# Patient Record
Sex: FEMALE | Race: WHITE | NOT HISPANIC OR LATINO | Employment: UNEMPLOYED | URBAN - NONMETROPOLITAN AREA
[De-identification: names, ages, dates, MRNs, and addresses within clinical notes are randomized per-mention and may not be internally consistent; named-entity substitution may affect disease eponyms.]

---

## 2018-09-14 ENCOUNTER — OFFICE VISIT (OUTPATIENT)
Dept: CARDIOLOGY | Facility: CLINIC | Age: 53
End: 2018-09-14

## 2018-09-14 VITALS
BODY MASS INDEX: 28.17 KG/M2 | WEIGHT: 165 LBS | HEIGHT: 64 IN | HEART RATE: 78 BPM | SYSTOLIC BLOOD PRESSURE: 126 MMHG | DIASTOLIC BLOOD PRESSURE: 72 MMHG

## 2018-09-14 DIAGNOSIS — R55 SYNCOPE, UNSPECIFIED SYNCOPE TYPE: ICD-10-CM

## 2018-09-14 DIAGNOSIS — I73.9 CLAUDICATION IN PERIPHERAL VASCULAR DISEASE (HCC): ICD-10-CM

## 2018-09-14 DIAGNOSIS — E11.9 TYPE 2 DIABETES MELLITUS WITHOUT COMPLICATION, UNSPECIFIED LONG TERM INSULIN USE STATUS: Primary | ICD-10-CM

## 2018-09-14 DIAGNOSIS — Z72.0 NICOTINE ABUSE: ICD-10-CM

## 2018-09-14 PROCEDURE — 93000 ELECTROCARDIOGRAM COMPLETE: CPT | Performed by: INTERNAL MEDICINE

## 2018-09-14 PROCEDURE — 99204 OFFICE O/P NEW MOD 45 MIN: CPT | Performed by: INTERNAL MEDICINE

## 2018-09-14 RX ORDER — INSULIN GLARGINE 100 [IU]/ML
25 INJECTION, SOLUTION SUBCUTANEOUS DAILY
COMMUNITY

## 2018-09-14 RX ORDER — CHLORAL HYDRATE 500 MG
1 CAPSULE ORAL DAILY
COMMUNITY

## 2018-09-14 RX ORDER — GABAPENTIN 300 MG/1
300 CAPSULE ORAL NIGHTLY
COMMUNITY

## 2018-09-14 RX ORDER — CEPHRADINE 500 MG
1 CAPSULE ORAL
COMMUNITY

## 2018-09-14 RX ORDER — LORATADINE 10 MG/1
10 TABLET ORAL DAILY
COMMUNITY

## 2018-09-14 NOTE — PROGRESS NOTES
[facility] Cardiology  OFFICE NOTE    Radha Najera  52 y.o. female    09/14/2018  1. Type 2 diabetes mellitus without complication, unspecified long term insulin use status (CMS/[facility])    2. Nicotine abuse    3. Syncope, unspecified syncope type    4. Claudication in peripheral vascular disease (CMS/[facility])        Chief complaint -syncope and claudication pain in the leg    History of present [id number] lady who came in with complaining of syncope while she is walking she had 3 episodes last episode about 3 weeks ago and she has been a diabetic for about 2 years, and she was morbidly obese before she used to weigh around 300 pounds and she has lost a lot of weight and now currently weighs 165 pounds.  She used to smoke 2 pack a day and now smokes about 6 cigarettes a day.  She is on medications for diabetes not on any medications which can lower the blood pressure.  She does have claudication pain in the legs left leg greater than right leg.  Has some numbness and tingling in both hands and feet.  Denies any GI symptoms of constipation or diarrhea.        No Known Allergies      Past Medical History:   Diagnosis Date   • Diabetes mellitus (CMS/[facility])    • Stroke (CMS/[facility])          Past Surgical History:   Procedure Laterality Date   • HYSTERECTOMY           Family History   Problem Relation Age of Onset   • Cancer Mother          Social History     Social History   • Marital status:      Spouse name: N/A   • Number of children: N/A   • Years of education: N/A     Occupational History   • Not on file.     Social History Main Topics   • Smoking status: Current Every Day Smoker     Packs/day: 0.50     Types: Cigarettes   • Smokeless tobacco: Never Used   • Alcohol use No   • Drug use: No   • Sexual activity: Defer     Other Topics Concern   • Not on file     Social History Narrative   • No narrative on file         Current Outpatient Prescriptions   Medication Sig Dispense Refill   •  "Alpha-Lipoic Acid 200 MG capsule Take 1 capsule by mouth.     • Ascorbic Acid (VITAMIN C PO) Take 1,000 mg by mouth Daily.     • B Complex-C (SUPER B COMPLEX PO) Take 1 tablet by mouth Daily.     • Chromium-Cinnamon (CINNAMON PLUS CHROMIUM) 200-1000 MCG-MG capsule Take 1 tablet by mouth Daily.     • Empagliflozin (JARDIANCE) 25 MG tablet Take 1 tablet by mouth Daily.     • gabapentin (NEURONTIN) 300 MG capsule Take 300 mg by mouth Every Night.     • insulin glargine (LANTUS) 100 UNIT/ML injection Inject 25 Units under the skin into the appropriate area as directed Daily.     • loratadine (CLARITIN) 10 MG tablet Take 10 mg by mouth Daily.     • magnesium oxide (MAGOX) 400 (241.3 Mg) MG tablet tablet Take 400 mg by mouth Daily.     • metFORMIN (GLUCOPHAGE) 500 MG tablet Take 500 mg by mouth Daily With Breakfast.     • Omega-3 1000 MG capsule Take 1 capsule by mouth Daily.       No current facility-administered medications for this visit.          Review of Systems     Constitution: Denies any fatigue, fever or chills    HENT: Denies any headache, hearing impairment,     Eyes: Denies any blurring of vision, or photophobia     Cardivascular - As per history of present illness     Respiratory system- denies any COPD,  shortness of breath, Sleep apnea     Endocrine:   diabetes,                         Musculoskeletal:  No history of arthritis with musculoskeletal problems    Gastrointestinal: No nausea, vomiting, or melena    Genitourinary: No dysuria or hematuria    Neurological:   No history of seizure disorder, stroke, memory problems, has peripheral neuropathy    Psychiatric/Behavioral:        No history of depression    Hematological- no history of easy bruising or any bleeding diathesis            OBJECTIVE    /72   Pulse 78   Ht 162.6 cm (64\")   Wt 74.8 kg (165 lb)   BMI 28.32 kg/m²       Physical Exam     Constitutional: is oriented to person, place, and time.     Skin-warm and dry    Well developed and " nourished in no acute distress      Head: Normocephalic and atraumatic.     Eyes: Pupils are equal    Neck: Neck supple. No bruit in the carotids    Cardiovascular: [place] in the fifth intercostal space   Regular rate, and  Rhythm,    S1 greater than S2, no S3 or S4, no gallop     Pulmonary/Chest:   Air  Entry is equal on both sides  No wheezing or crackles,      Abdominal: Soft.  No hepatosplenomegaly, bowel sounds are present    Musculoskeletal: No kyphoscoliosis, no significant thickening of the joints    Neurological: is alert and oriented to person, place, and time.    cranial nerve are intact .   No motor or sensory deficit    Extremities-no edema, no radial femoral delay      Psychiatric: He has a normal mood and affect.                  His behavior is normal.             ECG 12 Lead  Date/Time: 9/14/2018 8:33 AM  Performed by: VLADISLAV MCGHEE  Authorized by: VLADISLAV MCGHEE   Comparison: not compared with previous ECG   Rhythm: sinus rhythm  Clinical impression: normal ECG                           A/P  Syncope possibly autonomic related to do a tilt table test to rule out postural hypotension when she stands up and walking.    Occasional palpitations will do exercise treadmill to rule out ischemia also and arrhythmias as she has risk factors for CAD with prior history of obesity, diabetes and tobacco use.    Claudication pain in left leg greater than right leg will do a MAYRLIN.  Will get an echo to assess his LV function and valvular function.    Tobacco use doctor about quitting smoking.    She'll bring all the labs she had done by her family doctor in [place] when she comes back in 6 weeks              This document has been electronically signed by Vladislav Mcghee MD on September 14, 2018 8:33 AM       EMR Dragon/Transcription disclaimer:   Some of this note may be an electronic transcription/translation of spoken language to printed text. The electronic translation of spoken language  may permit erroneous, or at times, nonsensical words or phrases to be inadvertently transcribed; Although I have reviewed the note for such errors, some may still exist.

## 2018-10-05 ENCOUNTER — DOCUMENTATION (OUTPATIENT)
Dept: CARDIOLOGY | Facility: CLINIC | Age: 53
End: 2018-10-05

## 2018-10-05 DIAGNOSIS — R55 SYNCOPE, UNSPECIFIED SYNCOPE TYPE: Primary | ICD-10-CM

## 2018-10-05 LAB
BH CV LOWER ARTERIAL LEFT ABI RATIO: 1.1
BH CV LOWER ARTERIAL LEFT CALF RATIO: 1.16
BH CV LOWER ARTERIAL LEFT DORSALIS PEDIS SYS MAX: 132 MMHG
BH CV LOWER ARTERIAL LEFT LOW THIGH SYS MAX: 151 MMHG
BH CV LOWER ARTERIAL LEFT POPLITEAL SYS MAX: 142 MMHG
BH CV LOWER ARTERIAL LEFT POST TIBIAL SYS MAX: 134 MMHG
BH CV LOWER ARTERIAL RIGHT ABI RATIO: 1.16
BH CV LOWER ARTERIAL RIGHT CALF RATIO: 1.3
BH CV LOWER ARTERIAL RIGHT DORSALIS PEDIS SYS MAX: 137 MMHG
BH CV LOWER ARTERIAL RIGHT LOW THIGH SYS MAX: 156 MMHG
BH CV LOWER ARTERIAL RIGHT POPLITEAL SYS MAX: 159 MMHG
BH CV LOWER ARTERIAL RIGHT POST TIBIAL SYS MAX: 142 MMHG
BH CV STRESS BP STAGE 1: NORMAL
BH CV STRESS BP STAGE 2: NORMAL
BH CV STRESS BP STAGE 3: NORMAL
BH CV STRESS BP STAGE 4: NORMAL
BH CV STRESS DURATION MIN STAGE 1: 3
BH CV STRESS DURATION MIN STAGE 2: 3
BH CV STRESS DURATION MIN STAGE 3: 3
BH CV STRESS DURATION MIN STAGE 4: 3
BH CV STRESS DURATION SEC STAGE 1: 0
BH CV STRESS DURATION SEC STAGE 2: 0
BH CV STRESS DURATION SEC STAGE 3: 0
BH CV STRESS DURATION SEC STAGE 4: 0
BH CV STRESS GRADE STAGE 1: 10
BH CV STRESS GRADE STAGE 2: 12
BH CV STRESS GRADE STAGE 3: 14
BH CV STRESS GRADE STAGE 4: 16
BH CV STRESS HR STAGE 1: 106
BH CV STRESS HR STAGE 2: 100
BH CV STRESS HR STAGE 3: 127
BH CV STRESS HR STAGE 4: 144
BH CV STRESS METS STAGE 1: 5
BH CV STRESS METS STAGE 2: 7.5
BH CV STRESS METS STAGE 3: 10
BH CV STRESS METS STAGE 4: 13.5
BH CV STRESS PROTOCOL 1: NORMAL
BH CV STRESS RECOVERY BP: NORMAL MMHG
BH CV STRESS RECOVERY HR: 92 BPM
BH CV STRESS SPEED STAGE 1: 1.7
BH CV STRESS SPEED STAGE 2: 2.5
BH CV STRESS SPEED STAGE 3: 3.4
BH CV STRESS SPEED STAGE 4: 4.2
BH CV STRESS STAGE 1: 1
BH CV STRESS STAGE 2: 2
BH CV STRESS STAGE 3: 3
BH CV STRESS STAGE 4: 4
MAXIMAL PREDICTED HEART RATE: 169 BPM
PERCENT MAX PREDICTED HR: 86.39 %
STRESS BASELINE BP: NORMAL MMHG
STRESS BASELINE HR: 91 BPM
STRESS PERCENT HR: 102 %
STRESS POST ESTIMATED WORKLOAD: 11.6 METS
STRESS POST EXERCISE DUR MIN: 9 MIN
STRESS POST EXERCISE DUR SEC: 29 SEC
STRESS POST PEAK BP: NORMAL MMHG
STRESS POST PEAK HR: 146 BPM
STRESS TARGET HR: 144 BPM
UPPER ARTERIAL LEFT ARM BRACHIAL SYS MAX: 122 MMHG
UPPER ARTERIAL RIGHT ARM BRACHIAL SYS MAX: 121 MMHG

## 2018-10-08 ENCOUNTER — APPOINTMENT (OUTPATIENT)
Dept: CARDIOLOGY | Facility: HOSPITAL | Age: 53
End: 2018-10-08
Attending: INTERNAL MEDICINE

## 2018-11-12 ENCOUNTER — DOCUMENTATION (OUTPATIENT)
Dept: CARDIOLOGY | Facility: CLINIC | Age: 53
End: 2018-11-12

## 2018-11-12 LAB
BH CV ECHO MEAS - ACS: 1.9 CM
BH CV ECHO MEAS - AO MAX PG (FULL): 5.6 MMHG
BH CV ECHO MEAS - AO MAX PG: 11.8 MMHG
BH CV ECHO MEAS - AO MEAN PG (FULL): 3 MMHG
BH CV ECHO MEAS - AO MEAN PG: 6 MMHG
BH CV ECHO MEAS - AO ROOT AREA (BSA CORRECTED): 1.4
BH CV ECHO MEAS - AO ROOT AREA: 5.3 CM^2
BH CV ECHO MEAS - AO ROOT DIAM: 2.6 CM
BH CV ECHO MEAS - AO V2 MAX: 172 CM/SEC
BH CV ECHO MEAS - AO V2 MEAN: 120 CM/SEC
BH CV ECHO MEAS - AO V2 VTI: 35.8 CM
BH CV ECHO MEAS - ASC AORTA: 2.8 CM
BH CV ECHO MEAS - AVA(I,A): 2.3 CM^2
BH CV ECHO MEAS - AVA(I,D): 2.3 CM^2
BH CV ECHO MEAS - AVA(V,A): 2.5 CM^2
BH CV ECHO MEAS - AVA(V,D): 2.5 CM^2
BH CV ECHO MEAS - BSA(HAYCOCK): 1.9 M^2
BH CV ECHO MEAS - BSA: 1.8 M^2
BH CV ECHO MEAS - BZI_BMI: 28.3 KILOGRAMS/M^2
BH CV ECHO MEAS - BZI_METRIC_HEIGHT: 162.6 CM
BH CV ECHO MEAS - BZI_METRIC_WEIGHT: 74.8 KG
BH CV ECHO MEAS - EDV(CUBED): 64 ML
BH CV ECHO MEAS - EDV(TEICH): 70 ML
BH CV ECHO MEAS - EF(CUBED): 72.5 %
BH CV ECHO MEAS - EF(TEICH): 64.8 %
BH CV ECHO MEAS - ESV(CUBED): 17.6 ML
BH CV ECHO MEAS - ESV(TEICH): 24.6 ML
BH CV ECHO MEAS - FS: 35 %
BH CV ECHO MEAS - IVS/LVPW: 1
BH CV ECHO MEAS - IVSD: 0.9 CM
BH CV ECHO MEAS - LA DIMENSION: 3.2 CM
BH CV ECHO MEAS - LA/AO: 1.2
BH CV ECHO MEAS - LV MASS(C)D: 109.7 GRAMS
BH CV ECHO MEAS - LV MASS(C)DI: 60.9 GRAMS/M^2
BH CV ECHO MEAS - LV MAX PG: 6.3 MMHG
BH CV ECHO MEAS - LV MEAN PG: 3 MMHG
BH CV ECHO MEAS - LV V1 MAX: 125 CM/SEC
BH CV ECHO MEAS - LV V1 MEAN: 74.5 CM/SEC
BH CV ECHO MEAS - LV V1 VTI: 24.1 CM
BH CV ECHO MEAS - LVIDD: 4 CM
BH CV ECHO MEAS - LVIDS: 2.6 CM
BH CV ECHO MEAS - LVOT AREA (M): 3.5 CM^2
BH CV ECHO MEAS - LVOT AREA: 3.5 CM^2
BH CV ECHO MEAS - LVOT DIAM: 2.1 CM
BH CV ECHO MEAS - LVPWD: 0.9 CM
BH CV ECHO MEAS - MV A MAX VEL: 64.8 CM/SEC
BH CV ECHO MEAS - MV DEC SLOPE: 356 CM/SEC^2
BH CV ECHO MEAS - MV E MAX VEL: 72.1 CM/SEC
BH CV ECHO MEAS - MV E/A: 1.1
BH CV ECHO MEAS - MV P1/2T MAX VEL: 73 CM/SEC
BH CV ECHO MEAS - MV P1/2T: 60.1 MSEC
BH CV ECHO MEAS - MVA P1/2T LCG: 3 CM^2
BH CV ECHO MEAS - MVA(P1/2T): 3.7 CM^2
BH CV ECHO MEAS - PA MAX PG: 1.6 MMHG
BH CV ECHO MEAS - PA V2 MAX: 63.7 CM/SEC
BH CV ECHO MEAS - RAP SYSTOLE: 5 MMHG
BH CV ECHO MEAS - RVDD: 2.7 CM
BH CV ECHO MEAS - RVSP: 24.5 MMHG
BH CV ECHO MEAS - SI(AO): 105.5 ML/M^2
BH CV ECHO MEAS - SI(CUBED): 25.8 ML/M^2
BH CV ECHO MEAS - SI(LVOT): 46.3 ML/M^2
BH CV ECHO MEAS - SI(TEICH): 25.2 ML/M^2
BH CV ECHO MEAS - SV(AO): 190.1 ML
BH CV ECHO MEAS - SV(CUBED): 46.4 ML
BH CV ECHO MEAS - SV(LVOT): 83.5 ML
BH CV ECHO MEAS - SV(TEICH): 45.4 ML
BH CV ECHO MEAS - TR MAX VEL: 221 CM/SEC
LV EF 2D ECHO EST: 63 %

## 2019-01-16 ENCOUNTER — TRANSCRIBE ORDERS (OUTPATIENT)
Dept: PODIATRY | Facility: CLINIC | Age: 54
End: 2019-01-16

## 2019-01-16 DIAGNOSIS — M77.9 ENTHESOPATHY: ICD-10-CM

## 2019-01-16 DIAGNOSIS — M25.70 OSTEOPHYTE, UNSPECIFIED JOINT: Primary | ICD-10-CM

## 2019-01-30 ENCOUNTER — OFFICE VISIT (OUTPATIENT)
Dept: PODIATRY | Facility: CLINIC | Age: 54
End: 2019-01-30

## 2019-01-30 VITALS — HEIGHT: 64 IN | WEIGHT: 182.8 LBS | OXYGEN SATURATION: 97 % | HEART RATE: 108 BPM | BODY MASS INDEX: 31.21 KG/M2

## 2019-01-30 DIAGNOSIS — M79.672 LEFT FOOT PAIN: Primary | ICD-10-CM

## 2019-01-30 DIAGNOSIS — E11.42 TYPE 2 DIABETES MELLITUS WITH PERIPHERAL NEUROPATHY (HCC): ICD-10-CM

## 2019-01-30 DIAGNOSIS — M89.372 HYPERTROPHY OF BONE, LEFT ANKLE AND FOOT: ICD-10-CM

## 2019-01-30 PROCEDURE — 99203 OFFICE O/P NEW LOW 30 MIN: CPT | Performed by: PODIATRIST

## 2019-01-30 NOTE — PROGRESS NOTES
Radha Najera  1965  53 y.o. female   SHAHNAZ Evans - 1/15/2019  BS - 122 per pt    Patient presents today with complaints of foot pain and numbness and a knot on her left dorsal foot.    01/30/2019    Chief Complaint   Patient presents with   • Left Foot - diabetic foot care, Pain   • Right Foot - diabetic foot care       History of Present Illness    Radha Najera is a 53 y.o.female who presents to clinic today with chief complaint of left foot pain.  Patient states that there is a hard knot on the top of her left foot.  He has been present for greater than one year.  The patient rates the pain as an 8 out of 10 and states that it is constant.  She has done nothing to treat it.  She also complains of numbness, burning and tingling to both of her feet.  She has tried gabapentin in the past but does not like the way it made her feel so she discontinued the use of it.  She currently uses a topical cream which helps some.  She admits to being a diabetic.  She states her blood sugars are well controlled.  She has no other pedal complaints.      Past Medical History:   Diagnosis Date   • Diabetes mellitus (CMS/HCC)    • Ingrown toenail    • Stroke (CMS/HCC)          Past Surgical History:   Procedure Laterality Date   • HYSTERECTOMY           Family History   Problem Relation Age of Onset   • Cancer Mother    • Diabetes Maternal Grandmother    • Diabetes Maternal Grandfather        No Known Allergies    Social History     Socioeconomic History   • Marital status:      Spouse name: Not on file   • Number of children: Not on file   • Years of education: Not on file   • Highest education level: Not on file   Social Needs   • Financial resource strain: Not on file   • Food insecurity - worry: Not on file   • Food insecurity - inability: Not on file   • Transportation needs - medical: Not on file   • Transportation needs - non-medical: Not on file   Occupational History   • Not on file   Tobacco Use   • Smoking status:  "Current Every Day Smoker     Packs/day: 0.50     Types: Cigarettes   • Smokeless tobacco: Never Used   Substance and Sexual Activity   • Alcohol use: No   • Drug use: No   • Sexual activity: Defer   Other Topics Concern   • Not on file   Social History Narrative   • Not on file         Current Outpatient Medications   Medication Sig Dispense Refill   • Alpha-Lipoic Acid 200 MG capsule Take 1 capsule by mouth.     • Ascorbic Acid (VITAMIN C PO) Take 1,000 mg by mouth Daily.     • B Complex-C (SUPER B COMPLEX PO) Take 1 tablet by mouth Daily.     • Chromium-Cinnamon (CINNAMON PLUS CHROMIUM) 200-1000 MCG-MG capsule Take 1 tablet by mouth Daily.     • Empagliflozin (JARDIANCE) 25 MG tablet Take 1 tablet by mouth Daily.     • gabapentin (NEURONTIN) 300 MG capsule Take 300 mg by mouth Every Night.     • insulin glargine (LANTUS) 100 UNIT/ML injection Inject 25 Units under the skin into the appropriate area as directed Daily.     • loratadine (CLARITIN) 10 MG tablet Take 10 mg by mouth Daily.     • magnesium oxide (MAGOX) 400 (241.3 Mg) MG tablet tablet Take 400 mg by mouth Daily.     • metFORMIN (GLUCOPHAGE) 500 MG tablet Take 500 mg by mouth Daily With Breakfast.     • Omega-3 1000 MG capsule Take 1 capsule by mouth Daily.       No current facility-administered medications for this visit.        Review of Systems   Constitutional: Negative.    HENT: Negative.    Eyes: Negative.    Respiratory: Negative.    Cardiovascular: Negative.    Gastrointestinal: Negative.    Endocrine: Positive for heat intolerance.   Musculoskeletal: Positive for back pain and gait problem.        Bilateral foot pain  Ankle pain   Skin: Negative.    Neurological: Positive for numbness.   Psychiatric/Behavioral:        OCD  Bipolar         OBJECTIVE    Pulse 108   Ht 162.6 cm (64\")   Wt 82.9 kg (182 lb 12.8 oz)   SpO2 97%   BMI 31.38 kg/m²       Physical Exam   Constitutional: She is oriented to person, place, and time. She appears " well-developed and well-nourished. No distress.   HENT:   Head: Normocephalic and atraumatic.   Nose: Nose normal.   Eyes: Conjunctivae and EOM are normal. Pupils are equal, round, and reactive to light.   Pulmonary/Chest: Effort normal. No respiratory distress. She has no wheezes.    Radha had a diabetic foot exam performed today.  Neurological: She is alert and oriented to person, place, and time.   Skin: Skin is warm and dry. Capillary refill takes less than 2 seconds.   Psychiatric: She has a normal mood and affect. Her behavior is normal.   Vitals reviewed.      Gait: normal     Assistive Device: none     Lower Extremity    Cardiovascular:    DP/PT pulses palpable bilateral  CFT brisk  to all digits  Skin temp is warm to warm from proximal tibia to distal digits bilateral  Pedal hair growth present.   No erythema or edema noted     Musculoskeletal:  Muscle strength is 5/5 for all muscle groups tested   ROM of the 1st MTP is full without pain or crepitus bilateral  ROM of the MTJ is full without pain or crepitus  bilateral  ROM of the STJ is full without pain or crepitus bilateral   ROM of the ankle joint is full without pain or crepitus  bilateral  Palpable bony prominence noted to the dorsal lateral midfoot.  Pain on direct palpation.    Dermatological:   Nails 1-5 bilateral are within normal limits for length and thickness    Skin is warm, dry and intact bilateral  Webspaces 1-4 bilateral are clean, dry and intact.   No subcutaneous nodules or masses noted    No open wounds noted     Neurological:   Protective sensation diminished bilateral  Sensation intact to light touch bilateral         Procedures        ASSESSMENT AND PLAN    Radha was seen today for diabetic foot care, pain and diabetic foot care.    Diagnoses and all orders for this visit:    Left foot pain  -     XR Foot 3+ View Left    Type 2 diabetes mellitus with peripheral neuropathy (CMS/HCC)    Hypertrophy of bone, left ankle and foot      -  Comprehensive foot and ankle exam performed.   - X-rays taken and reviewed  - Treatment options discussed with patient for bony deformity to her dorsal left foot.  Recommended lacing modifications.   - A diabetic foot screening exam was performed and the patient was educated on the foot complications related to diabetes,  preventative foot care and what signs and symptoms to watch for.  Instructed to contact our office if any foot problems develop before next visit.  - All questions were answered to the patients satisfaction.  - RTC 6 months           This document has been electronically signed by Kvng Baker DPM on January 30, 2019 2:50 PM     1/30/2019  2:50 PM

## 2020-03-02 ENCOUNTER — OFFICE VISIT (OUTPATIENT)
Dept: OBSTETRICS AND GYNECOLOGY | Facility: CLINIC | Age: 55
End: 2020-03-02

## 2020-03-02 VITALS
BODY MASS INDEX: 32.78 KG/M2 | WEIGHT: 192 LBS | SYSTOLIC BLOOD PRESSURE: 126 MMHG | HEIGHT: 64 IN | DIASTOLIC BLOOD PRESSURE: 82 MMHG

## 2020-03-02 DIAGNOSIS — N95.1 MENOPAUSAL SYMPTOMS: ICD-10-CM

## 2020-03-02 DIAGNOSIS — N76.1 SUBACUTE VAGINITIS: Primary | ICD-10-CM

## 2020-03-02 PROCEDURE — 99204 OFFICE O/P NEW MOD 45 MIN: CPT | Performed by: NURSE PRACTITIONER

## 2020-03-02 RX ORDER — CYANOCOBALAMIN (VITAMIN B-12) 500 MCG
800 LOZENGE ORAL
COMMUNITY

## 2020-03-02 NOTE — PROGRESS NOTES
"Ofelia Najera is a 54 y.o. presents with c/o of hot flashes numerous times per day and severe, constant vaginal itching.    LMP- 9/6/2011  S/P  TVH/BSO 9/6/11; no HRT since    Last pap- 2011  Last mammo- 2006- [facility] with [facility]    Vaginitis   This is a chronic problem. The current episode started more than 1 year ago. The problem occurs daily. The problem has been gradually worsening. Associated symptoms include diaphoresis. Pertinent negatives include no abdominal pain, chest pain, chills, fatigue, fever, myalgias or urinary symptoms. Associated symptoms comments: \"boils\" on labia  . Nothing aggravates the symptoms. Treatments tried: vaginal yeast creams, changing soaps, cotton underwear, no sex x2 years. The treatment provided no relief.       The following portions of the patient's history were reviewed and updated as appropriate: allergies, current medications, past family history, past medical history, past social history, past surgical history and problem list.    Review of Systems   Constitutional: Positive for diaphoresis. Negative for activity change, appetite change, chills, fatigue, fever, unexpected weight gain and unexpected weight loss.   Respiratory: Negative for chest tightness and shortness of breath.    Cardiovascular: Negative for chest pain and palpitations.   Gastrointestinal: Negative for abdominal distention, abdominal pain, constipation and diarrhea.   Endocrine: Negative.         + hot flashes 5-7 times per day   Genitourinary: Positive for decreased libido and vaginal pain. Negative for breast discharge, breast lump, breast pain, dysuria, pelvic pain, pelvic pressure, urinary incontinence, vaginal bleeding and vaginal discharge.        Constant vaginal itching. Random boils on labia that pop and drain pus. Hasn't been sexually active in 2 years due to discomfort.   Musculoskeletal: Negative for myalgias.   Skin: Negative for color change, dry skin and skin lesions. "   Neurological: Negative for light-headedness and headache.   Psychiatric/Behavioral: Negative for agitation, dysphoric mood, sleep disturbance, depressed mood and stress. The patient is not nervous/anxious.        Objective   Physical Exam   Constitutional: She is oriented to person, place, and time. Vital signs are normal. She appears well-developed and well-nourished. No distress.   Cardiovascular: Normal rate, regular rhythm and normal heart sounds.   Pulmonary/Chest: Effort normal and breath sounds normal.   Genitourinary: There is rash and tenderness on the right labia. There is no lesion or injury on the right labia. There is rash and tenderness on the left labia. There is no lesion or injury on the left labia. There is erythema and tenderness in the vagina. No bleeding in the vagina. No foreign body in the vagina. No signs of injury around the vagina. No vaginal discharge found.   Genitourinary Comments: Cervix, uterus and adnexa are absent. Vaginal atrophy and erythema present. One Swab obtained.    Lymphadenopathy: No inguinal adenopathy noted on the right or left side.   Neurological: She is alert and oriented to person, place, and time.   Skin: Skin is warm and dry. She is not diaphoretic.   Psychiatric: She has a normal mood and affect. Her behavior is normal.   Nursing note and vitals reviewed.        Assessment/Plan   Radha was seen today for vaginal discharge.    Diagnoses and all orders for this visit:    Subacute vaginitis  -     OneSwab - Kit, Vagina; Future    Menopausal symptoms    Other orders  -     conjugated estrogens (PREMARIN) 0.625 MG/GM vaginal cream; Insert 0.5g vaginally at bedtime 2 nights per week.  -     estrogens, conjugated, (PREMARIN) 0.625 MG tablet; Take 1 tablet by mouth Daily.    Will call with One Swab results when available. Start on Premarin oral and vaginal. R/B/A and potential s/e reviewed. Samples and discount cards provided. F/U in 1 year for refills or sooner,  PRN.

## 2020-03-10 RX ORDER — METRONIDAZOLE 500 MG/1
500 TABLET ORAL 2 TIMES DAILY
Qty: 14 TABLET | Refills: 0 | Status: SHIPPED | OUTPATIENT
Start: 2020-03-10 | End: 2020-03-17

## 2020-03-13 DIAGNOSIS — N76.1 SUBACUTE VAGINITIS: ICD-10-CM
